# Patient Record
Sex: MALE | Race: WHITE | Employment: FULL TIME | URBAN - METROPOLITAN AREA
[De-identification: names, ages, dates, MRNs, and addresses within clinical notes are randomized per-mention and may not be internally consistent; named-entity substitution may affect disease eponyms.]

---

## 2017-10-26 ENCOUNTER — HOSPITAL ENCOUNTER (OUTPATIENT)
Dept: OTHER | Age: 37
Discharge: HOME | End: 2017-10-26
Attending: FAMILY MEDICINE | Admitting: CLINIC/CENTER

## 2017-10-29 LAB
CULTURE: NORMAL
REPORT STATUS: NORMAL
REQUEST PROBLEM: NORMAL
SPECIMEN: NORMAL

## 2021-11-19 PROBLEM — R59.0 LAD (LYMPHADENOPATHY), INTRA-ABDOMINAL: Status: ACTIVE | Noted: 2021-11-19

## 2024-03-01 ENCOUNTER — INITIAL CONSULT (OUTPATIENT)
Dept: ONCOLOGY | Age: 44
End: 2024-03-01
Payer: COMMERCIAL

## 2024-03-01 ENCOUNTER — HOSPITAL ENCOUNTER (OUTPATIENT)
Dept: INFUSION THERAPY | Age: 44
Discharge: HOME | End: 2024-03-01
Payer: COMMERCIAL

## 2024-03-01 VITALS
WEIGHT: 289 LBS | HEIGHT: 72 IN | HEART RATE: 77 BPM | SYSTOLIC BLOOD PRESSURE: 157 MMHG | OXYGEN SATURATION: 97 % | TEMPERATURE: 98.4 F | DIASTOLIC BLOOD PRESSURE: 73 MMHG | BODY MASS INDEX: 39.14 KG/M2

## 2024-03-01 DIAGNOSIS — R79.89 ELEVATED FERRITIN: ICD-10-CM

## 2024-03-01 DIAGNOSIS — R79.89 ELEVATED FERRITIN: Primary | ICD-10-CM

## 2024-03-01 LAB
BASOPHILS ABSOLUTE: 0 K/CU MM
BASOPHILS RELATIVE PERCENT: 0.3 % (ref 0–1)
DIFFERENTIAL TYPE: ABNORMAL
EOSINOPHILS ABSOLUTE: 0.2 K/CU MM
EOSINOPHILS RELATIVE PERCENT: 3.3 % (ref 0–3)
ERYTHROCYTE SEDIMENTATION RATE: 1 MM/HR (ref 0–15)
FERRITIN: 356 NG/ML (ref 30–400)
HCT VFR BLD CALC: 42.5 % (ref 42–52)
HEMOGLOBIN: 14.3 GM/DL (ref 13.5–18)
IRON: 76 UG/DL (ref 59–158)
LYMPHOCYTES ABSOLUTE: 2 K/CU MM
LYMPHOCYTES RELATIVE PERCENT: 28.2 % (ref 24–44)
MCH RBC QN AUTO: 30.2 PG (ref 27–31)
MCHC RBC AUTO-ENTMCNC: 33.6 % (ref 32–36)
MCV RBC AUTO: 89.9 FL (ref 78–100)
MONOCYTES ABSOLUTE: 0.6 K/CU MM
MONOCYTES RELATIVE PERCENT: 8.6 % (ref 0–4)
PCT TRANSFERRIN: 19 % (ref 10–44)
PDW BLD-RTO: 14.1 % (ref 11.7–14.9)
PLATELET # BLD: 196 K/CU MM (ref 140–440)
PMV BLD AUTO: 10.6 FL (ref 7.5–11.1)
RBC # BLD: 4.73 M/CU MM (ref 4.6–6.2)
SEGMENTED NEUTROPHILS ABSOLUTE COUNT: 4.2 K/CU MM
SEGMENTED NEUTROPHILS RELATIVE PERCENT: 59.6 % (ref 36–66)
TOTAL IRON BINDING CAPACITY: 408 UG/DL (ref 250–450)
UNSATURATED IRON BINDING CAPACITY: 332 UG/DL (ref 110–370)
WBC # BLD: 7 K/CU MM (ref 4–10.5)

## 2024-03-01 PROCEDURE — 99204 OFFICE O/P NEW MOD 45 MIN: CPT | Performed by: INTERNAL MEDICINE

## 2024-03-01 PROCEDURE — 99202 OFFICE O/P NEW SF 15 MIN: CPT

## 2024-03-01 PROCEDURE — 85652 RBC SED RATE AUTOMATED: CPT

## 2024-03-01 PROCEDURE — 36415 COLL VENOUS BLD VENIPUNCTURE: CPT

## 2024-03-01 PROCEDURE — 83540 ASSAY OF IRON: CPT

## 2024-03-01 PROCEDURE — 82728 ASSAY OF FERRITIN: CPT

## 2024-03-01 PROCEDURE — 81256 HFE GENE: CPT

## 2024-03-01 PROCEDURE — 83550 IRON BINDING TEST: CPT

## 2024-03-01 PROCEDURE — 85025 COMPLETE CBC W/AUTO DIFF WBC: CPT

## 2024-03-01 RX ORDER — CHLORAL HYDRATE 500 MG
2 CAPSULE ORAL 2 TIMES DAILY
COMMUNITY

## 2024-03-01 RX ORDER — ALLOPURINOL 300 MG/1
TABLET ORAL
COMMUNITY
Start: 2024-02-19

## 2024-03-01 ASSESSMENT — PATIENT HEALTH QUESTIONNAIRE - PHQ9
SUM OF ALL RESPONSES TO PHQ QUESTIONS 1-9: 0
1. LITTLE INTEREST OR PLEASURE IN DOING THINGS: 0
SUM OF ALL RESPONSES TO PHQ QUESTIONS 1-9: 0
2. FEELING DOWN, DEPRESSED OR HOPELESS: 0
SUM OF ALL RESPONSES TO PHQ9 QUESTIONS 1 & 2: 0

## 2024-03-01 NOTE — PROGRESS NOTES
MA Rooming Questions  Patient: Federico Carlos  MRN: [id number]    Date: 3/1/2024        New Patient        5. Did the patient have a depression screening completed today? Yes    PHQ-9 Total Score: 0 (3/1/2024  2:56 PM)       PHQ-9 Given to (if applicable):               PHQ-9 Score (if applicable):                     [] Positive     [x]  Negative              Does question #9 need addressed (if applicable)                     [] Yes    []  No               Hermelinda Franklin CMA    
polydipsia, No hot flashes, No thyroid symptoms.  Hematologic:  No epistaxis, No gingival bleeding, No petechiae, No ecchymosis.  Lymphatic:  No lymphadenopathy, No lymphedema.  Allergy / Immunologic:  No eczema, No frequent mucous infections, No frequent respiratory infections, No recurrent urticarial, No frequent skin infections.     Vital Signs: There were no vitals taken for this visit.     Physical Exam:  CONSTITUTIONAL: awake, alert, cooperative, no apparent distress   EYES: pupils equal, round and reactive to light, sclera clear and conjunctiva normal  ENT: Normocephalic, without obvious abnormality, atraumatic  NECK: supple, symmetrical, no jugular venous distension and no carotid bruits   HEMATOLOGIC/LYMPHATIC: no cervical, supraclavicular or axillary lymphadenopathy   LUNGS: no increased work of breathing and clear to auscultation   CARDIOVASCULAR: regular rate and rhythm, normal S1 and S2, no murmur noted  ABDOMEN: normal bowel sounds x 4, soft, non-distended, non-tender, no masses palpated, no hepatosplenomegaly   MUSCULOSKELETAL: full range of motion noted, tone is normal  NEUROLOGIC: awake, alert, oriented to name, place and time. Motor skills grossly intact.   SKIN: Normal skin color, texture, turgor and no jaundice. appears intact   EXTREMITIES: no LE edema        Labs:  Hematology:  Lab Results   Component Value Date    WBC 7.6 08/14/2018    RBC 4.15 (L) 08/14/2018    HGB 12.2 (L) 08/14/2018    HCT 37.5 (L) 08/14/2018    MCV 90.4 08/14/2018    MCH 29.4 08/14/2018    MCHC 32.5 08/14/2018    RDW 13.1 08/14/2018     08/14/2018    MPV 11.1 08/14/2018    SEGSPCT 57.3 08/14/2018    EOSRELPCT 1.0 08/14/2018    BASOPCT 0.3 08/14/2018    LYMPHOPCT 34.3 08/14/2018    MONOPCT 6.7 (H) 08/14/2018    SEGSABS 4.4 08/14/2018    EOSABS 0.1 08/14/2018    BASOSABS 0.0 08/14/2018    LYMPHSABS 2.6 08/14/2018    MONOSABS 0.5 08/14/2018    DIFFTYPE AUTOMATED DIFFERENTIAL 08/14/2018     No results found for:

## 2024-03-08 LAB
HFE GENE MUT ANL BLD/T: NORMAL
HFE P.C282Y BLD/T QL: NEGATIVE
HFE P.H63D BLD/T QL: NEGATIVE
HFE P.S65C BLD/T QL: NEGATIVE
SPECIMEN SOURCE: NORMAL

## 2024-03-31 NOTE — PROGRESS NOTES
Patient Name:  Federico Carlos  Patient :  1980  Patient MRN:  [id number]     Primary Oncologist: Donna Bansal MD  Referring Provider: Qing Layne APRN - CNP     Date of Service: 2024     Chief Complaint:    Chief Complaint   Patient presents with    Follow-up     FU visit.     Patient Active Problem List:     LAD (lymphadenopathy), intra-abdominal    HPI:   Federico Carlos is a pleasant 42 yo male patient who was referred for evaluation of FH of hemochromatosis and elevated ferritin on 3/1/2024.  Maternal great GF and uncles have hemochromatosis.   He had been taking oral iron since he was 7. He quit iron supplement ~ 6 months ago.  He drinks vodka daily, 4 - 6 shots.   Since he stopped vaping, he has gained weight.    No children. He has one brother and sister.  He has 3 daughters.   Maternal GM has lung cancer. No know FH of father side.  Maternal great grandfather has hemochromatosis.  3 maternal great uncles have hemochromatosis.    2024 Ferritin 467, Iron 78, TIBC 369, sat 21%.  B-12 1039, Folate >20, CBC wnl.    I doubt that he has hereditary hemochromatosis based on Iron study.  We discuss about alcohol consumption which may increase Iron.  We discuss about low iron diet.  I ordered labs including genetic hemochromatosis study.    2024 follow up visit.    3/1/09308 CBC wnl. Ferritin 356, Iron 76, TIBC 408, sat 19. Sed rate 1. Hemochromatosis study negative.  He drinks vodka with seltzer 3 - 5 x daily.  Typically I expect ferritin to be higher and TIBC to be at lower range.  I am concerned about GIB.  I refer to GI for further evaluation. He is at age which requires colon cancer screening.  Repeat labs prior to next OV.   No acute pain.  Denies any nausea, vomiting or diarrhea.  No fever or chills.  No chest pain, shortness of breath or palpitation.  No headache or dizzy spell.  No specific bone pain.  No melena or hematochezia.  Denied any dysuria or hematuria.    Past Medical History:

## 2024-04-05 ENCOUNTER — OFFICE VISIT (OUTPATIENT)
Dept: ONCOLOGY | Age: 44
End: 2024-04-05
Payer: COMMERCIAL

## 2024-04-05 ENCOUNTER — HOSPITAL ENCOUNTER (OUTPATIENT)
Dept: INFUSION THERAPY | Age: 44
Discharge: HOME | End: 2024-04-05
Payer: COMMERCIAL

## 2024-04-05 VITALS
TEMPERATURE: 98 F | RESPIRATION RATE: 18 BRPM | WEIGHT: 293.8 LBS | BODY MASS INDEX: 39.8 KG/M2 | OXYGEN SATURATION: 98 % | HEIGHT: 72 IN | SYSTOLIC BLOOD PRESSURE: 148 MMHG | HEART RATE: 71 BPM | DIASTOLIC BLOOD PRESSURE: 73 MMHG

## 2024-04-05 DIAGNOSIS — R79.89 ELEVATED FERRITIN: Primary | ICD-10-CM

## 2024-04-05 DIAGNOSIS — Z12.11 SCREENING FOR COLON CANCER: ICD-10-CM

## 2024-04-05 PROCEDURE — 99213 OFFICE O/P EST LOW 20 MIN: CPT | Performed by: INTERNAL MEDICINE

## 2024-04-05 PROCEDURE — 99211 OFF/OP EST MAY X REQ PHY/QHP: CPT

## 2024-04-05 NOTE — PROGRESS NOTES
MA Rooming Questions  Patient: Federico Carlos  MRN: [id number]    Date: 4/5/2024        1. Do you have any new issues?   no         2. Do you need any refills on medications?    no    3. Have you had any imaging done since your last visit?   no    4. Have you been hospitalized or seen in the emergency room since your last visit here?   no    5. Did the patient have a depression screening completed today? No    No data recorded     PHQ-9 Given to (if applicable):               PHQ-9 Score (if applicable):                     [] Positive     []  Negative              Does question #9 need addressed (if applicable)                     [] Yes    []  No               Myrna Dan CMA

## 2024-04-09 ENCOUNTER — TELEPHONE (OUTPATIENT)
Dept: GASTROENTEROLOGY | Age: 44
End: 2024-04-09

## 2024-04-09 NOTE — TELEPHONE ENCOUNTER
Called pt. In regards to a referral for a colon screening due to concerns for GIB. LM for pt to call back to make an appt

## 2024-04-16 ENCOUNTER — TELEPHONE (OUTPATIENT)
Dept: GASTROENTEROLOGY | Age: 44
End: 2024-04-16

## 2024-04-16 NOTE — TELEPHONE ENCOUNTER
Called pt. In regards to a referral for a colon screening due to concerns for GIB. Made appt for pt to see charla on 5/8/24 @11:30am

## 2024-05-08 ENCOUNTER — PREP FOR PROCEDURE (OUTPATIENT)
Dept: GASTROENTEROLOGY | Age: 44
End: 2024-05-08

## 2024-05-08 ENCOUNTER — OFFICE VISIT (OUTPATIENT)
Dept: GASTROENTEROLOGY | Age: 44
End: 2024-05-08

## 2024-05-08 VITALS
TEMPERATURE: 98.2 F | DIASTOLIC BLOOD PRESSURE: 68 MMHG | HEART RATE: 70 BPM | OXYGEN SATURATION: 94 % | HEIGHT: 72 IN | WEIGHT: 291.4 LBS | SYSTOLIC BLOOD PRESSURE: 126 MMHG | BODY MASS INDEX: 39.47 KG/M2

## 2024-05-08 DIAGNOSIS — Z12.11 COLON CANCER SCREENING: ICD-10-CM

## 2024-05-08 DIAGNOSIS — Z12.11 ENCOUNTER FOR SCREENING COLONOSCOPY: Primary | ICD-10-CM

## 2024-05-08 RX ORDER — VITAMIN B COMPLEX
1 CAPSULE ORAL DAILY
COMMUNITY

## 2024-05-08 RX ORDER — POLYETHYLENE GLYCOL 3350, SODIUM SULFATE, SODIUM CHLORIDE, POTASSIUM CHLORIDE, ASCORBIC ACID, SODIUM ASCORBATE 140-9-5.2G
1 KIT ORAL ONCE
Qty: 1 EACH | Refills: 0 | Status: SHIPPED | OUTPATIENT
Start: 2024-05-08 | End: 2024-05-08

## 2024-05-08 RX ORDER — SIMETHICONE 80 MG
80 TABLET,CHEWABLE ORAL ONCE
Qty: 3 TABLET | Refills: 0 | Status: SHIPPED | OUTPATIENT
Start: 2024-05-08 | End: 2024-05-08

## 2024-05-08 ASSESSMENT — ENCOUNTER SYMPTOMS
COLOR CHANGE: 0
BLOOD IN STOOL: 0
NAUSEA: 0
WHEEZING: 0
SHORTNESS OF BREATH: 0
CONSTIPATION: 0
EYE DISCHARGE: 0
VOMITING: 0
ABDOMINAL PAIN: 0
EYE PAIN: 0
DIARRHEA: 0
COUGH: 0
BACK PAIN: 0

## 2024-05-08 NOTE — PROGRESS NOTES
Federico Carlos 43 y.o. male was seen by SALIMA Hernández on 5/8/2024     Wt Readings from Last 3 Encounters:   05/08/24 132.2 kg (291 lb 6.4 oz)   04/05/24 133.3 kg (293 lb 12.8 oz)   03/01/24 131.1 kg (289 lb)       HPI  Federico Carlos is a pleasant 43 y.o.  male who presents today to schedule a screening colonoscopy.  He has a past medical history of depression and hypertension.  He has never had a colonoscopy.  He stopped smoking cigarettes a year ago.  He stopped heavy alcohol use two weeks ago.  He denies NSAID use.  He follows with Dr. Bansal for elevated Ferritin and family history of hemochromatosis.  His hemochromatosis mutation was negative.  His appetite is good without early satiety.  His weight is stable.  No nausea or vomiting.  No abdominal pain, blaoting or distention.  No heartburn or acid reflux.  No nocturnal awakenings with acid reflux. No dysphagia or pain with swallowing.  No excess belching or flatulence.  He denies changes in his bowel pattern.  His typical bowel pattern is daily to twice daily with soft brown formed stools.  No diarrhea or constipation.  No blood in his stools or melena.  No family history of stomach or colon cancer.    ROS  Review of Systems   Constitutional:  Negative for appetite change, chills, diaphoresis, fatigue, fever and unexpected weight change.   HENT:  Negative for ear pain, hearing loss and tinnitus.    Eyes:  Negative for pain, discharge and visual disturbance.   Respiratory:  Negative for cough, shortness of breath and wheezing.    Cardiovascular:  Negative for chest pain, palpitations and leg swelling.   Gastrointestinal:  Negative for abdominal pain, blood in stool, constipation, diarrhea, nausea and vomiting.   Endocrine: Negative for cold intolerance and heat intolerance.   Genitourinary:  Negative for dysuria, frequency, hematuria and urgency.   Musculoskeletal:  Negative for back pain, myalgias and neck pain.   Skin:  Negative for color change,

## 2024-05-09 RX ORDER — SODIUM CHLORIDE 0.9 % (FLUSH) 0.9 %
5-40 SYRINGE (ML) INJECTION PRN
OUTPATIENT
Start: 2024-05-09

## 2024-05-09 RX ORDER — SODIUM CHLORIDE 9 MG/ML
25 INJECTION, SOLUTION INTRAVENOUS PRN
OUTPATIENT
Start: 2024-05-09

## 2024-05-09 RX ORDER — SODIUM CHLORIDE 0.9 % (FLUSH) 0.9 %
5-40 SYRINGE (ML) INJECTION EVERY 12 HOURS SCHEDULED
OUTPATIENT
Start: 2024-05-09

## 2024-06-07 PROBLEM — Z12.11 COLON CANCER SCREENING: Status: RESOLVED | Noted: 2024-05-08 | Resolved: 2024-06-07

## 2024-06-16 NOTE — PROGRESS NOTES
Patient Name:  Federico Carlos  Patient :  1980  Patient MRN:  [id number]     Primary Oncologist: Donna Bansal MD  Referring Provider: Qing Layne APRN - CNP     Date of Service: 2024     Chief Complaint:    No chief complaint on file.    FU visit.     Patient Active Problem List:     LAD (lymphadenopathy), intra-abdominal    HPI:   Federico Carlos is a pleasant 44 yo male patient who was referred for evaluation of FH of hemochromatosis and elevated ferritin on 3/1/2024.  Maternal great GF and uncles have hemochromatosis.   He had been taking oral iron since he was 7. He quit iron supplement ~ 6 months ago.  He drinks vodka daily, 4 - 6 shots.   Since he stopped vaping, he has gained weight.    No children. He has one brother and sister.  He has 3 daughters.   Maternal GM has lung cancer. No know FH of father side.  Maternal great grandfather has hemochromatosis.  3 maternal great uncles have hemochromatosis.    2024 Ferritin 467, Iron 78, TIBC 369, sat 21%.  B-12 1039, Folate >20, CBC wnl.    I doubt that he has hereditary hemochromatosis based on Iron study.  We discuss about alcohol consumption which may increase Iron.  We discuss about low iron diet.  I ordered labs including genetic hemochromatosis study.  3/1/2024 CBC wnl. Ferritin 356, Iron 76, TIBC 408, sat 19.   Sed rate 1. Hemochromatosis study negative.  He drinks vodka with seltzer 3 - 5 x daily.  Typically I expect ferritin to be higher and TIBC to be at lower range.  I am concerned about GIB.  I refer to GI for further evaluation. He is at age which requires colon cancer screening.    2024 follow up visit.    2024 WBC 5.2, Hg 13.4, MCV 89.6, plat 180.   Ferritin 304, Iron 90. TIBC 358, sat 25.   Repeat labs prior to next OV.  We discussed about EtOH.  Reports colonoscopy was denied by insurance. Recommend to monitor for blood in stool or bowel habit changes.    No acute pain.  Denies any nausea, vomiting or diarrhea.  No fever or

## 2024-07-02 ENCOUNTER — HOSPITAL ENCOUNTER (OUTPATIENT)
Dept: INFUSION THERAPY | Age: 44
Discharge: HOME | End: 2024-07-02

## 2024-07-02 DIAGNOSIS — Z12.11 SCREENING FOR COLON CANCER: ICD-10-CM

## 2024-07-02 LAB
BASOPHILS ABSOLUTE: 0 K/CU MM
BASOPHILS RELATIVE PERCENT: 0.4 % (ref 0–1)
DIFFERENTIAL TYPE: ABNORMAL
EOSINOPHILS ABSOLUTE: 0.2 K/CU MM
EOSINOPHILS RELATIVE PERCENT: 3.1 % (ref 0–3)
FERRITIN: 304 NG/ML (ref 30–400)
HCT VFR BLD CALC: 40.5 % (ref 42–52)
HEMOGLOBIN: 13.4 GM/DL (ref 13.5–18)
IRON: 90 UG/DL (ref 59–158)
LYMPHOCYTES ABSOLUTE: 1.5 K/CU MM
LYMPHOCYTES RELATIVE PERCENT: 29.7 % (ref 24–44)
MCH RBC QN AUTO: 29.6 PG (ref 27–31)
MCHC RBC AUTO-ENTMCNC: 33.1 % (ref 32–36)
MCV RBC AUTO: 89.6 FL (ref 78–100)
MONOCYTES ABSOLUTE: 0.4 K/CU MM
MONOCYTES RELATIVE PERCENT: 7.5 % (ref 0–4)
NEUTROPHILS ABSOLUTE: 3.1 K/CU MM
NEUTROPHILS RELATIVE PERCENT: 59.3 % (ref 36–66)
PCT TRANSFERRIN: 25 % (ref 10–44)
PDW BLD-RTO: 14.1 % (ref 11.7–14.9)
PLATELET # BLD: 180 K/CU MM (ref 140–440)
PMV BLD AUTO: 10.9 FL (ref 7.5–11.1)
RBC # BLD: 4.52 M/CU MM (ref 4.6–6.2)
TOTAL IRON BINDING CAPACITY: 358 UG/DL (ref 250–450)
UNSATURATED IRON BINDING CAPACITY: 268 UG/DL (ref 110–370)
WBC # BLD: 5.2 K/CU MM (ref 4–10.5)

## 2024-07-02 PROCEDURE — 83540 ASSAY OF IRON: CPT

## 2024-07-02 PROCEDURE — 36415 COLL VENOUS BLD VENIPUNCTURE: CPT

## 2024-07-02 PROCEDURE — 82728 ASSAY OF FERRITIN: CPT

## 2024-07-02 PROCEDURE — 83550 IRON BINDING TEST: CPT

## 2024-07-02 PROCEDURE — 85025 COMPLETE CBC W/AUTO DIFF WBC: CPT

## 2024-07-09 ENCOUNTER — OFFICE VISIT (OUTPATIENT)
Dept: ONCOLOGY | Age: 44
End: 2024-07-09
Payer: COMMERCIAL

## 2024-07-09 ENCOUNTER — HOSPITAL ENCOUNTER (OUTPATIENT)
Dept: INFUSION THERAPY | Age: 44
Discharge: HOME | End: 2024-07-09
Payer: COMMERCIAL

## 2024-07-09 VITALS
SYSTOLIC BLOOD PRESSURE: 140 MMHG | WEIGHT: 292.6 LBS | DIASTOLIC BLOOD PRESSURE: 70 MMHG | TEMPERATURE: 98.2 F | OXYGEN SATURATION: 97 % | HEIGHT: 72 IN | BODY MASS INDEX: 39.63 KG/M2 | HEART RATE: 62 BPM | RESPIRATION RATE: 18 BRPM

## 2024-07-09 DIAGNOSIS — R53.83 OTHER FATIGUE: ICD-10-CM

## 2024-07-09 DIAGNOSIS — R79.89 ELEVATED FERRITIN: ICD-10-CM

## 2024-07-09 DIAGNOSIS — D64.9 ANEMIA, UNSPECIFIED TYPE: Primary | ICD-10-CM

## 2024-07-09 PROCEDURE — 99211 OFF/OP EST MAY X REQ PHY/QHP: CPT

## 2024-07-09 PROCEDURE — 99213 OFFICE O/P EST LOW 20 MIN: CPT | Performed by: INTERNAL MEDICINE

## 2024-07-09 NOTE — PROGRESS NOTES
MA Rooming Questions  Patient: Federico Carlos  MRN: [id number]    Date: 7/9/2024        1. Do you have any new issues?   no         2. Do you need any refills on medications?    no    3. Have you had any imaging done since your last visit?   no    4. Have you been hospitalized or seen in the emergency room since your last visit here?   no    5. Did the patient have a depression screening completed today? No    No data recorded     PHQ-9 Given to (if applicable):               PHQ-9 Score (if applicable):                     [] Positive     []  Negative              Does question #9 need addressed (if applicable)                     [] Yes    []  No               Myrna Dan CMA

## 2024-09-04 PROBLEM — Z12.11 COLON CANCER SCREENING: Status: ACTIVE | Noted: 2024-05-08

## 2024-09-14 NOTE — PROGRESS NOTES
Patient Name:  Federico Carlos  Patient :  1980  Patient MRN:  [id number]     Primary Oncologist: Donna Bansal MD  Referring Provider: Qing Layne APRN - CNP     Date of Service: 10/9/2024     Chief Complaint:    Chief Complaint   Patient presents with    Follow-up     FU visit.     Patient Active Problem List:     LAD (lymphadenopathy), intra-abdominal    HPI:   Federico Carlos is a pleasant 42 yo male patient who was referred for evaluation of FH of hemochromatosis and elevated ferritin on 3/1/2024.  Maternal great GF and uncles have hemochromatosis.   He had been taking oral iron since he was 7. He quit iron supplement ~ 6 months ago.  He drinks vodka daily, 4 - 6 shots.   Since he stopped vaping, he has gained weight.    No children. He has one brother and sister.  He has 3 daughters.   Maternal GM has lung cancer. No know FH of father side.  Maternal great grandfather has hemochromatosis.  3 maternal great uncles have hemochromatosis.    2024 Ferritin 467, Iron 78, TIBC 369, sat 21%.  B-12 1039, Folate >20, CBC wnl.    I doubt that he has hereditary hemochromatosis based on Iron study.  We discuss about alcohol consumption which may increase Iron.  We discuss about low iron diet.  3/1/2024 CBC wnl. Ferritin 356, Iron 76, TIBC 408, sat 19.   Sed rate 1. Hemochromatosis study negative.  He drinks vodka with seltzer 3 - 5 x daily.  Typically I expect ferritin to be higher and TIBC to be at lower range.  I am concerned about GIB.  I refer to GI for further evaluation. He is at age which requires colon cancer screening.  2024 WBC 5.2, Hg 13.4, MCV 89.6, plat 180.   Ferritin 304, Iron 90. TIBC 358, sat 25.     Reports colonoscopy was denied by insurance. Recommend to monitor for blood in stool or bowel habit changes.    10/9/2024 follow up visit.    10/2/2024 creat 0.8. ALT 50, AST 75. TSH wnl. B-12 864, CBC wnl.   Ferritin 471, Iron 85, TIBC 399, sat 21. Folate 18.9.   I recommend to cut back on red

## 2024-10-02 ENCOUNTER — HOSPITAL ENCOUNTER (OUTPATIENT)
Dept: INFUSION THERAPY | Age: 44
Discharge: HOME | End: 2024-10-02
Payer: COMMERCIAL

## 2024-10-02 DIAGNOSIS — D64.9 ANEMIA, UNSPECIFIED TYPE: ICD-10-CM

## 2024-10-02 DIAGNOSIS — R79.89 ELEVATED FERRITIN: ICD-10-CM

## 2024-10-02 LAB
ALBUMIN SERPL-MCNC: 4.6 G/DL (ref 3.4–5)
ALBUMIN/GLOB SERPL: 1.8 {RATIO} (ref 1.1–2.2)
ALP SERPL-CCNC: 59 U/L (ref 40–129)
ALT SERPL-CCNC: 50 U/L (ref 10–40)
ANION GAP SERPL CALCULATED.3IONS-SCNC: 12 MMOL/L (ref 9–17)
AST SERPL-CCNC: 75 U/L (ref 15–37)
BASOPHILS # BLD: 0.02 K/UL
BASOPHILS NFR BLD: 0 % (ref 0–1)
BILIRUB SERPL-MCNC: 0.4 MG/DL (ref 0–1)
BUN SERPL-MCNC: 10 MG/DL (ref 7–20)
CALCIUM SERPL-MCNC: 9.9 MG/DL (ref 8.3–10.6)
CHLORIDE SERPL-SCNC: 99 MMOL/L (ref 99–110)
CO2 SERPL-SCNC: 27 MMOL/L (ref 21–32)
CREAT SERPL-MCNC: 0.8 MG/DL (ref 0.9–1.3)
EOSINOPHIL # BLD: 0.13 K/UL
EOSINOPHILS RELATIVE PERCENT: 2 % (ref 0–3)
ERYTHROCYTE [DISTWIDTH] IN BLOOD BY AUTOMATED COUNT: 13.6 % (ref 11.7–14.9)
FERRITIN SERPL-MCNC: 471 NG/ML (ref 30–400)
FOLATE SERPL-MCNC: 18.9 NG/ML (ref 4.8–24.2)
GFR, ESTIMATED: >90 ML/MIN/1.73M2
GLUCOSE SERPL-MCNC: 93 MG/DL (ref 74–99)
HCT VFR BLD AUTO: 43.3 % (ref 42–52)
HGB BLD-MCNC: 14.3 G/DL (ref 13.5–18)
IRON SATN MFR SERPL: 21 % (ref 15–50)
IRON SERPL-MCNC: 85 UG/DL (ref 59–158)
LYMPHOCYTES NFR BLD: 1.88 K/UL
LYMPHOCYTES RELATIVE PERCENT: 29 % (ref 24–44)
MCH RBC QN AUTO: 29.7 PG (ref 27–31)
MCHC RBC AUTO-ENTMCNC: 33 G/DL (ref 32–36)
MCV RBC AUTO: 89.8 FL (ref 78–100)
MONOCYTES NFR BLD: 0.48 K/UL
MONOCYTES NFR BLD: 7 % (ref 0–4)
NEUTROPHILS NFR BLD: 61 % (ref 36–66)
NEUTS SEG NFR BLD: 3.95 K/UL
PLATELET # BLD AUTO: 202 K/UL (ref 140–440)
PMV BLD AUTO: 10.9 FL (ref 7.5–11.1)
POTASSIUM SERPL-SCNC: 4.9 MMOL/L (ref 3.5–5.1)
PROT SERPL-MCNC: 7.1 G/DL (ref 6.4–8.2)
RBC # BLD AUTO: 4.82 M/UL (ref 4.6–6.2)
SODIUM SERPL-SCNC: 137 MMOL/L (ref 136–145)
TIBC SERPL-MCNC: 399 UG/DL (ref 260–445)
TSH SERPL DL<=0.05 MIU/L-ACNC: 2.19 UIU/ML (ref 0.27–4.2)
UNSATURATED IRON BINDING CAPACITY: 314 UG/DL (ref 110–370)
VIT B12 SERPL-MCNC: 864 PG/ML (ref 211–911)
WBC OTHER # BLD: 6.5 K/UL (ref 4–10.5)

## 2024-10-02 PROCEDURE — 84630 ASSAY OF ZINC: CPT

## 2024-10-02 PROCEDURE — 84165 PROTEIN E-PHORESIS SERUM: CPT

## 2024-10-02 PROCEDURE — 83550 IRON BINDING TEST: CPT

## 2024-10-02 PROCEDURE — 82728 ASSAY OF FERRITIN: CPT

## 2024-10-02 PROCEDURE — 82607 VITAMIN B-12: CPT

## 2024-10-02 PROCEDURE — 83540 ASSAY OF IRON: CPT

## 2024-10-02 PROCEDURE — 82525 ASSAY OF COPPER: CPT

## 2024-10-02 PROCEDURE — 85025 COMPLETE CBC W/AUTO DIFF WBC: CPT

## 2024-10-02 PROCEDURE — 84443 ASSAY THYROID STIM HORMONE: CPT

## 2024-10-02 PROCEDURE — 84155 ASSAY OF PROTEIN SERUM: CPT

## 2024-10-02 PROCEDURE — 82746 ASSAY OF FOLIC ACID SERUM: CPT

## 2024-10-02 PROCEDURE — 36415 COLL VENOUS BLD VENIPUNCTURE: CPT

## 2024-10-02 PROCEDURE — 80053 COMPREHEN METABOLIC PANEL: CPT

## 2024-10-04 PROBLEM — Z12.11 COLON CANCER SCREENING: Status: RESOLVED | Noted: 2024-05-08 | Resolved: 2024-10-04

## 2024-10-04 LAB — COPPER SERPL-MCNC: 102.3 UG/DL (ref 70–140)

## 2024-10-05 LAB — ZINC SERPL-MCNC: 80 UG/DL (ref 60–120)

## 2024-10-09 ENCOUNTER — HOSPITAL ENCOUNTER (OUTPATIENT)
Dept: INFUSION THERAPY | Age: 44
Discharge: HOME | End: 2024-10-09
Payer: COMMERCIAL

## 2024-10-09 ENCOUNTER — OFFICE VISIT (OUTPATIENT)
Dept: ONCOLOGY | Age: 44
End: 2024-10-09
Payer: COMMERCIAL

## 2024-10-09 VITALS
OXYGEN SATURATION: 97 % | BODY MASS INDEX: 40.31 KG/M2 | TEMPERATURE: 97.9 F | WEIGHT: 297.6 LBS | RESPIRATION RATE: 18 BRPM | DIASTOLIC BLOOD PRESSURE: 77 MMHG | HEIGHT: 72 IN | SYSTOLIC BLOOD PRESSURE: 144 MMHG | HEART RATE: 64 BPM

## 2024-10-09 DIAGNOSIS — R79.89 ELEVATED FERRITIN: Primary | ICD-10-CM

## 2024-10-09 PROCEDURE — 99213 OFFICE O/P EST LOW 20 MIN: CPT | Performed by: INTERNAL MEDICINE

## 2024-10-09 PROCEDURE — 99211 OFF/OP EST MAY X REQ PHY/QHP: CPT

## 2024-10-09 ASSESSMENT — PATIENT HEALTH QUESTIONNAIRE - PHQ9
2. FEELING DOWN, DEPRESSED OR HOPELESS: NOT AT ALL
SUM OF ALL RESPONSES TO PHQ QUESTIONS 1-9: 0
1. LITTLE INTEREST OR PLEASURE IN DOING THINGS: NOT AT ALL
SUM OF ALL RESPONSES TO PHQ QUESTIONS 1-9: 0
SUM OF ALL RESPONSES TO PHQ9 QUESTIONS 1 & 2: 0

## 2024-10-09 NOTE — PROGRESS NOTES
MA Rooming Questions  Patient: Federico Carlos  MRN: [id number]    Date: 10/9/2024        1. Do you have any new issues?   no         2. Do you need any refills on medications?    no    3. Have you had any imaging done since your last visit?   no    4. Have you been hospitalized or seen in the emergency room since your last visit here?   no    5. Did the patient have a depression screening completed today? Yes    No data recorded     PHQ-9 Given to (if applicable):               PHQ-9 Score (if applicable):                     [] Positive     []  Negative              Does question #9 need addressed (if applicable)                     [] Yes    []  No               Myrna Dan CMA

## 2024-10-10 LAB
MISCELLANEOUS LAB TEST RESULT: NORMAL
TEST NAME: NORMAL

## 2024-10-16 PROBLEM — Z12.11 COLON CANCER SCREENING: Status: ACTIVE | Noted: 2024-05-08

## 2024-10-28 ENCOUNTER — TELEPHONE (OUTPATIENT)
Dept: GASTROENTEROLOGY | Age: 44
End: 2024-10-28

## 2024-11-15 PROBLEM — Z12.11 COLON CANCER SCREENING: Status: RESOLVED | Noted: 2024-05-08 | Resolved: 2024-11-15

## 2025-01-02 ENCOUNTER — HOSPITAL ENCOUNTER (OUTPATIENT)
Dept: INFUSION THERAPY | Age: 45
Discharge: HOME | End: 2025-01-02
Payer: COMMERCIAL

## 2025-01-02 DIAGNOSIS — R79.89 ELEVATED FERRITIN: ICD-10-CM

## 2025-01-02 LAB
ALBUMIN SERPL-MCNC: 4.4 G/DL (ref 3.4–5)
ALBUMIN/GLOB SERPL: 1.8 {RATIO} (ref 1.1–2.2)
ALP SERPL-CCNC: 64 U/L (ref 40–129)
ALT SERPL-CCNC: 43 U/L (ref 10–40)
ANION GAP SERPL CALCULATED.3IONS-SCNC: 10 MMOL/L (ref 9–17)
AST SERPL-CCNC: 47 U/L (ref 15–37)
BASOPHILS # BLD: 0.02 K/UL
BASOPHILS NFR BLD: 0 % (ref 0–1)
BILIRUB SERPL-MCNC: 0.3 MG/DL (ref 0–1)
BUN SERPL-MCNC: 10 MG/DL (ref 7–20)
CALCIUM SERPL-MCNC: 10 MG/DL (ref 8.3–10.6)
CHLORIDE SERPL-SCNC: 101 MMOL/L (ref 99–110)
CO2 SERPL-SCNC: 28 MMOL/L (ref 21–32)
CREAT SERPL-MCNC: 0.7 MG/DL (ref 0.9–1.3)
EOSINOPHIL # BLD: 0.13 K/UL
EOSINOPHILS RELATIVE PERCENT: 3 % (ref 0–3)
ERYTHROCYTE [DISTWIDTH] IN BLOOD BY AUTOMATED COUNT: 14 % (ref 11.7–14.9)
FERRITIN SERPL-MCNC: 321 NG/ML (ref 30–400)
GFR, ESTIMATED: >90 ML/MIN/1.73M2
GLUCOSE SERPL-MCNC: 121 MG/DL (ref 74–99)
HCT VFR BLD AUTO: 42.8 % (ref 42–52)
HGB BLD-MCNC: 14.1 G/DL (ref 13.5–18)
IRON SATN MFR SERPL: 23 % (ref 15–50)
IRON SERPL-MCNC: 85 UG/DL (ref 59–158)
LYMPHOCYTES NFR BLD: 1.61 K/UL
LYMPHOCYTES RELATIVE PERCENT: 31 % (ref 24–44)
MCH RBC QN AUTO: 29.9 PG (ref 27–31)
MCHC RBC AUTO-ENTMCNC: 32.9 G/DL (ref 32–36)
MCV RBC AUTO: 90.9 FL (ref 78–100)
MONOCYTES NFR BLD: 0.37 K/UL
MONOCYTES NFR BLD: 7 % (ref 0–4)
NEUTROPHILS NFR BLD: 59 % (ref 36–66)
NEUTS SEG NFR BLD: 2.99 K/UL
PLATELET # BLD AUTO: 175 K/UL (ref 140–440)
PMV BLD AUTO: 10.6 FL (ref 7.5–11.1)
POTASSIUM SERPL-SCNC: 4.5 MMOL/L (ref 3.5–5.1)
PROT SERPL-MCNC: 7 G/DL (ref 6.4–8.2)
RBC # BLD AUTO: 4.71 M/UL (ref 4.6–6.2)
SODIUM SERPL-SCNC: 139 MMOL/L (ref 136–145)
TIBC SERPL-MCNC: 375 UG/DL (ref 260–445)
UNSATURATED IRON BINDING CAPACITY: 290 UG/DL (ref 110–370)
WBC OTHER # BLD: 5.1 K/UL (ref 4–10.5)

## 2025-01-02 PROCEDURE — 80053 COMPREHEN METABOLIC PANEL: CPT

## 2025-01-02 PROCEDURE — 83550 IRON BINDING TEST: CPT

## 2025-01-02 PROCEDURE — 83540 ASSAY OF IRON: CPT

## 2025-01-02 PROCEDURE — 85025 COMPLETE CBC W/AUTO DIFF WBC: CPT

## 2025-01-02 PROCEDURE — 36415 COLL VENOUS BLD VENIPUNCTURE: CPT

## 2025-01-02 PROCEDURE — 82728 ASSAY OF FERRITIN: CPT
